# Patient Record
Sex: MALE | HISPANIC OR LATINO
[De-identification: names, ages, dates, MRNs, and addresses within clinical notes are randomized per-mention and may not be internally consistent; named-entity substitution may affect disease eponyms.]

---

## 2021-04-26 ENCOUNTER — APPOINTMENT (OUTPATIENT)
Dept: DISASTER EMERGENCY | Facility: OTHER | Age: 31
End: 2021-04-26
Payer: COMMERCIAL

## 2021-04-26 PROCEDURE — 0001A: CPT

## 2021-05-26 ENCOUNTER — APPOINTMENT (OUTPATIENT)
Dept: DISASTER EMERGENCY | Facility: OTHER | Age: 31
End: 2021-05-26
Payer: COMMERCIAL

## 2021-05-26 PROCEDURE — 0002A: CPT

## 2023-04-28 ENCOUNTER — APPOINTMENT (OUTPATIENT)
Age: 33
End: 2023-04-28
Payer: SELF-PAY

## 2023-04-28 VITALS
SYSTOLIC BLOOD PRESSURE: 149 MMHG | RESPIRATION RATE: 14 BRPM | OXYGEN SATURATION: 96 % | HEART RATE: 83 BPM | TEMPERATURE: 99.5 F | DIASTOLIC BLOOD PRESSURE: 90 MMHG

## 2023-04-28 DIAGNOSIS — R19.7 DIARRHEA, UNSPECIFIED: ICD-10-CM

## 2023-04-28 PROBLEM — Z00.00 ENCOUNTER FOR PREVENTIVE HEALTH EXAMINATION: Status: ACTIVE | Noted: 2023-04-28

## 2023-04-28 PROCEDURE — 99203 OFFICE O/P NEW LOW 30 MIN: CPT

## 2023-05-17 NOTE — HISTORY OF PRESENT ILLNESS
[FreeTextEntry8] : 32 y.o. M with no pmhx presents with bloody diarrhea for 8 days associated with non-productive cough, runny nose, and "feeling hot" since 7 days ago. He goes about 8 times a day, describes it as "not a lot in amount."  Denies nausea, vomiting, and abdominal pain. He is drinking a lot of water and eating regular. Denies recent sick contacts or close contacts with similar symptoms.

## 2023-05-17 NOTE — PLAN
[FreeTextEntry1] : 32 y.o. M with no pmhx presenting with 8 day bloody diarrhea associated with upper respiratory symptoms. \par \par Diarrhea most likely infectious:\par Offered Loperamide, but patient declined.\par Encouraged to stay hydrated and drink ginger ale. \par Avoid sugary food and decrease salt intake. \par If symptoms worsen, RTC.

## 2023-05-17 NOTE — REVIEW OF SYSTEMS
[Diarrhea] : diarrhea [Dizziness] : dizziness [Negative] : Psychiatric [Abdominal Pain] : no abdominal pain [Nausea] : no nausea [Vomiting] : no vomiting

## 2023-10-24 ENCOUNTER — APPOINTMENT (OUTPATIENT)
Age: 33
End: 2023-10-24
Payer: SELF-PAY

## 2023-10-24 VITALS
DIASTOLIC BLOOD PRESSURE: 86 MMHG | RESPIRATION RATE: 14 BRPM | SYSTOLIC BLOOD PRESSURE: 126 MMHG | BODY MASS INDEX: 29.49 KG/M2 | HEART RATE: 82 BPM | HEIGHT: 65 IN | OXYGEN SATURATION: 99 % | WEIGHT: 177 LBS

## 2023-10-24 DIAGNOSIS — M25.531 PAIN IN RIGHT WRIST: ICD-10-CM

## 2023-10-24 PROBLEM — Z00.00 ENCOUNTER FOR PREVENTIVE HEALTH EXAMINATION: Status: ACTIVE | Noted: 2023-10-24

## 2023-10-24 PROCEDURE — 99203 OFFICE O/P NEW LOW 30 MIN: CPT

## 2024-03-06 ENCOUNTER — APPOINTMENT (OUTPATIENT)
Age: 34
End: 2024-03-06
Payer: SELF-PAY

## 2024-03-06 VITALS
HEIGHT: 65 IN | WEIGHT: 180 LBS | HEART RATE: 75 BPM | OXYGEN SATURATION: 95 % | SYSTOLIC BLOOD PRESSURE: 150 MMHG | TEMPERATURE: 99 F | BODY MASS INDEX: 29.99 KG/M2 | DIASTOLIC BLOOD PRESSURE: 80 MMHG

## 2024-03-06 DIAGNOSIS — S01.111A LACERATION W/OUT FOREIGN BODY OF RIGHT EYELID AND PERIOCULAR AREA, INITIAL ENCOUNTER: ICD-10-CM

## 2024-03-06 PROCEDURE — 99215 OFFICE O/P EST HI 40 MIN: CPT
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
## 2024-03-06 NOTE — PHYSICAL EXAM
[No Acute Distress] : no acute distress [Well Developed] : well developed [Well Nourished] : well nourished [No Respiratory Distress] : no respiratory distress  [No Accessory Muscle Use] : no accessory muscle use [Clear to Auscultation] : lungs were clear to auscultation bilaterally [Normal Rate] : normal rate  [Regular Rhythm] : with a regular rhythm [Normal S1, S2] : normal S1 and S2 [de-identified] : minar right eye 3cm linear laceration, non-oozing blood  [Normal] : normal rate, regular rhythm, normal S1 and S2 and no murmur heard

## 2024-03-06 NOTE — PLAN
[FreeTextEntry1] : Right eyebrow laceration- no need for stiches Wound cleaned with hydrogen peroxide and water applied Dermabond  applied steristrips   pt received Tdap vaccine Gave Keflex 500mg BID for 7 days   RTC for wound care

## 2024-03-06 NOTE — REVIEW OF SYSTEMS
[Fever] : no fever [Chills] : no chills [Fatigue] : no fatigue [Chest Pain] : no chest pain [Palpitations] : no palpitations [Claudication] : no  leg claudication [Shortness Of Breath] : no shortness of breath [Wheezing] : no wheezing [Cough] : no cough [Negative] : Respiratory [FreeTextEntry3] : right eyebrow bleeding

## 2024-03-08 ENCOUNTER — APPOINTMENT (OUTPATIENT)
Age: 34
End: 2024-03-08
Payer: SELF-PAY

## 2024-03-08 DIAGNOSIS — S01.111S LACERATION W/OUT FOREIGN BODY OF RIGHT EYELID AND PERIOCULAR AREA, SEQUELA: ICD-10-CM

## 2024-03-08 DIAGNOSIS — S01.111D LACERATION W/OUT FOREIGN BODY OF RIGHT EYELID AND PERIOCULAR AREA, SUBSEQUENT ENCOUNTER: ICD-10-CM

## 2024-03-08 PROCEDURE — 99213 OFFICE O/P EST LOW 20 MIN: CPT

## 2024-03-08 NOTE — PLAN
[FreeTextEntry1] : 33 y o m 3 cm right eyebrow laceration: Continue current management. No signs of infection. RTC as needed for wound care.

## 2024-03-08 NOTE — PHYSICAL EXAM
[Normal Sclera/Conjunctiva] : normal sclera/conjunctiva [PERRL] : pupils equal round and reactive to light [EOMI] : extraocular movements intact [Normal] : affect was normal and insight and judgment were intact [de-identified] : 3 cm linear closed laceration. No erythema, no drainage, no pus.

## 2024-03-08 NOTE — HISTORY OF PRESENT ILLNESS
[de-identified] : 33 y o m presents for f/u of 3 cm laceration on right eyebrow. Patient has no complaints.

## 2024-04-09 ENCOUNTER — APPOINTMENT (OUTPATIENT)
Age: 34
End: 2024-04-09
Payer: SELF-PAY

## 2024-04-09 VITALS
OXYGEN SATURATION: 96 % | TEMPERATURE: 99 F | SYSTOLIC BLOOD PRESSURE: 131 MMHG | BODY MASS INDEX: 28.66 KG/M2 | DIASTOLIC BLOOD PRESSURE: 81 MMHG | HEIGHT: 65 IN | WEIGHT: 172 LBS | HEART RATE: 89 BPM

## 2024-04-09 DIAGNOSIS — J10.1 INFLUENZA DUE TO OTHER IDENTIFIED INFLUENZA VIRUS WITH OTHER RESPIRATORY MANIFESTATIONS: ICD-10-CM

## 2024-04-09 DIAGNOSIS — R50.81 FEVER PRESENTING WITH CONDITIONS CLASSIFIED ELSEWHERE: ICD-10-CM

## 2024-04-09 DIAGNOSIS — R52 PAIN, UNSPECIFIED: ICD-10-CM

## 2024-04-09 DIAGNOSIS — R05.9 COUGH, UNSPECIFIED: ICD-10-CM

## 2024-04-09 PROCEDURE — 99213 OFFICE O/P EST LOW 20 MIN: CPT

## 2024-04-09 NOTE — PLAN
[FreeTextEntry1] : 33 y o m with: Coughing, body aches, fever x 2 days. COVID, influenza and RSV ordered today. Tylenol 500 mg #10 given to pt. Rest, plenty of water.  Will call pt in the morning with result. .

## 2024-04-09 NOTE — PHYSICAL EXAM
[No Acute Distress] : no acute distress [No Respiratory Distress] : no respiratory distress  [No Accessory Muscle Use] : no accessory muscle use [Normal Rate] : normal rate  [Regular Rhythm] : with a regular rhythm [Normal S1, S2] : normal S1 and S2 [Soft] : abdomen soft [Non Tender] : non-tender [Normal Bowel Sounds] : normal bowel sounds [No CVA Tenderness] : no CVA  tenderness [No Spinal Tenderness] : no spinal tenderness [Grossly Normal Strength/Tone] : grossly normal strength/tone [No Rash] : no rash [Normal] : affect was normal and insight and judgment were intact

## 2024-04-11 PROBLEM — J10.1 INFLUENZA A: Status: ACTIVE | Noted: 2024-04-11

## 2024-04-11 LAB
INFLUENZA A RESULT: DETECTED
INFLUENZA B RESULT: NOT DETECTED
RESP SYN VIRUS RESULT: NOT DETECTED
SARS-COV-2 RESULT: NOT DETECTED

## 2025-01-23 ENCOUNTER — APPOINTMENT (OUTPATIENT)
Age: 35
End: 2025-01-23